# Patient Record
Sex: FEMALE | Race: WHITE | NOT HISPANIC OR LATINO | Employment: OTHER | URBAN - METROPOLITAN AREA
[De-identification: names, ages, dates, MRNs, and addresses within clinical notes are randomized per-mention and may not be internally consistent; named-entity substitution may affect disease eponyms.]

---

## 2024-02-05 ENCOUNTER — LAB (OUTPATIENT)
Dept: LAB | Facility: LAB | Age: 89
End: 2024-02-05
Payer: COMMERCIAL

## 2024-02-05 DIAGNOSIS — E55.9 VITAMIN D DEFICIENCY, UNSPECIFIED: ICD-10-CM

## 2024-02-05 DIAGNOSIS — L50.9 URTICARIA, UNSPECIFIED: ICD-10-CM

## 2024-02-05 DIAGNOSIS — R53.83 OTHER FATIGUE: Primary | ICD-10-CM

## 2024-02-05 DIAGNOSIS — E78.2 MIXED HYPERLIPIDEMIA: ICD-10-CM

## 2024-02-05 DIAGNOSIS — E11.9 TYPE 2 DIABETES MELLITUS WITHOUT COMPLICATIONS (MULTI): ICD-10-CM

## 2024-02-05 LAB
25(OH)D3 SERPL-MCNC: 45 NG/ML (ref 30–100)
ERYTHROCYTE [DISTWIDTH] IN BLOOD BY AUTOMATED COUNT: 13.1 % (ref 11.5–14.5)
EST. AVERAGE GLUCOSE BLD GHB EST-MCNC: 100 MG/DL
HBA1C MFR BLD: 5.1 %
HCT VFR BLD AUTO: 42.9 % (ref 36–46)
HGB BLD-MCNC: 14.4 G/DL (ref 12–16)
MCH RBC QN AUTO: 32.4 PG (ref 26–34)
MCHC RBC AUTO-ENTMCNC: 33.6 G/DL (ref 32–36)
MCV RBC AUTO: 96 FL (ref 80–100)
NRBC BLD-RTO: 0 /100 WBCS (ref 0–0)
PLATELET # BLD AUTO: 207 X10*3/UL (ref 150–450)
RBC # BLD AUTO: 4.45 X10*6/UL (ref 4–5.2)
TSH SERPL-ACNC: 1.31 MIU/L (ref 0.44–3.98)
WBC # BLD AUTO: 2.8 X10*3/UL (ref 4.4–11.3)

## 2024-02-05 PROCEDURE — 36415 COLL VENOUS BLD VENIPUNCTURE: CPT

## 2024-02-05 PROCEDURE — 82306 VITAMIN D 25 HYDROXY: CPT

## 2024-02-05 PROCEDURE — 80053 COMPREHEN METABOLIC PANEL: CPT

## 2024-02-05 PROCEDURE — 83036 HEMOGLOBIN GLYCOSYLATED A1C: CPT

## 2024-02-05 PROCEDURE — 84443 ASSAY THYROID STIM HORMONE: CPT

## 2024-02-05 PROCEDURE — 80061 LIPID PANEL: CPT

## 2024-02-05 PROCEDURE — 85027 COMPLETE CBC AUTOMATED: CPT

## 2024-02-06 LAB
ALBUMIN SERPL BCP-MCNC: 3.4 G/DL (ref 3.4–5)
ALP SERPL-CCNC: 91 U/L (ref 33–136)
ALT SERPL W P-5'-P-CCNC: 18 U/L (ref 7–45)
ANION GAP SERPL CALC-SCNC: 10 MMOL/L (ref 10–20)
AST SERPL W P-5'-P-CCNC: 21 U/L (ref 9–39)
BILIRUB SERPL-MCNC: 0.4 MG/DL (ref 0–1.2)
BUN SERPL-MCNC: 22 MG/DL (ref 6–23)
CALCIUM SERPL-MCNC: 9.4 MG/DL (ref 8.6–10.6)
CHLORIDE SERPL-SCNC: 111 MMOL/L (ref 98–107)
CHOLEST SERPL-MCNC: 187 MG/DL (ref 0–199)
CHOLESTEROL/HDL RATIO: 2.6
CO2 SERPL-SCNC: 30 MMOL/L (ref 21–32)
CREAT SERPL-MCNC: 0.95 MG/DL (ref 0.5–1.05)
EGFRCR SERPLBLD CKD-EPI 2021: 57 ML/MIN/1.73M*2
GLUCOSE SERPL-MCNC: 73 MG/DL (ref 74–99)
HDLC SERPL-MCNC: 72.6 MG/DL
LDLC SERPL CALC-MCNC: 104 MG/DL
NON HDL CHOLESTEROL: 114 MG/DL (ref 0–149)
POTASSIUM SERPL-SCNC: 4.7 MMOL/L (ref 3.5–5.3)
PROT SERPL-MCNC: 6.8 G/DL (ref 6.4–8.2)
SODIUM SERPL-SCNC: 146 MMOL/L (ref 136–145)
TRIGL SERPL-MCNC: 52 MG/DL (ref 0–149)
VLDL: 10 MG/DL (ref 0–40)

## 2024-03-14 ENCOUNTER — CONSULT (OUTPATIENT)
Dept: ALLERGY | Facility: CLINIC | Age: 89
End: 2024-03-14
Payer: COMMERCIAL

## 2024-03-14 VITALS
SYSTOLIC BLOOD PRESSURE: 139 MMHG | WEIGHT: 162 LBS | HEIGHT: 64 IN | DIASTOLIC BLOOD PRESSURE: 67 MMHG | TEMPERATURE: 97.5 F | BODY MASS INDEX: 27.66 KG/M2 | OXYGEN SATURATION: 96 % | HEART RATE: 60 BPM

## 2024-03-14 DIAGNOSIS — R21 RASH AND NONSPECIFIC SKIN ERUPTION: Primary | ICD-10-CM

## 2024-03-14 DIAGNOSIS — L29.9 PRURITUS: ICD-10-CM

## 2024-03-14 PROCEDURE — 99204 OFFICE O/P NEW MOD 45 MIN: CPT | Performed by: ALLERGY & IMMUNOLOGY

## 2024-03-14 RX ORDER — CETIRIZINE HYDROCHLORIDE 10 MG/1
10 TABLET ORAL DAILY PRN
Qty: 30 TABLET | Refills: 11 | Status: SHIPPED | OUTPATIENT
Start: 2024-03-14 | End: 2025-03-14

## 2024-03-14 ASSESSMENT — ENCOUNTER SYMPTOMS
RESPIRATORY NEGATIVE: 1
GASTROINTESTINAL NEGATIVE: 1
EYES NEGATIVE: 1
CONSTITUTIONAL NEGATIVE: 1
MUSCULOSKELETAL NEGATIVE: 1
ALLERGIC/IMMUNOLOGIC NEGATIVE: 1
HEMATOLOGIC/LYMPHATIC NEGATIVE: 1
CARDIOVASCULAR NEGATIVE: 1

## 2024-03-14 ASSESSMENT — PAIN SCALES - GENERAL: PAINLEVEL: 0-NO PAIN

## 2024-03-14 NOTE — PATIENT INSTRUCTIONS
It was nice to meet you today    We have referred you to  Dermatology for your rash     You may use Cetirizine (Zyrtec) 10 mg once daily as needed     Recommend using Dove Sensitive Skin soap and Cerave healing ointment

## 2024-03-14 NOTE — PROGRESS NOTES
"Radha Pinzon presents for initial evaluation today.      She presents with her daughter for today's visit.  History is obtained by Ms. Pinzon and her daughter.  They note that she has had an itchy rash on her lower legs since around age 45.  She is followed with wound management for stasis ulcers in the past.  They note that in August 2023, she developed an itchy rash which was different on her thighs, arms, underneath the breasts.  She is following with a nurse practitioner at [facility] and was started on hydroxyzine, topical triamcinolone, and a water pill.  She has also used cetirizine.  She notes that the topical agents have not helped, and her rash is very pruritic.  She has never had a skin biopsy.  The rash did not resolve with triamcinolone.     She denies being on any medications or supplements prior to the onset of the rash.     She denies history of allergic rhinitis, asthma, eczema, food allergy, drug allergy, venom allergy.      Review of Systems   Constitutional: Negative.    HENT: Negative.     Eyes: Negative.    Respiratory: Negative.     Cardiovascular: Negative.    Gastrointestinal: Negative.    Musculoskeletal: Negative.    Skin:  Positive for rash.   Allergic/Immunologic: Negative.    Hematological: Negative.        Vital signs:  /67   Pulse 60   Temp 36.4 °C (97.5 °F)   Ht 1.626 m (5' 4\")   Wt 73.5 kg (162 lb)   SpO2 96%   BMI 27.81 kg/m²     Physical Exam:  GENERAL: Alert, oriented and in no acute distress.     HEENT: EYES: No conjunctival injection or cobblestoning. Nose: nasal turbinates are not edematous and are not boggy.  There is no mucous stranding, polyps, or blood    noted. EARS: Tympanic membranes are clear. MOUTH: moist and pink with no exudates, ulcers, or thrush. NECK: is supple, without adenopathy.  No upper airway stridor noted.       HEART: regular rate and rhythm.       LUNGS: Clear to auscultation bilaterally. No wheezing, rhonchi or rales.        " ABDOMEN: Positive bowel sounds, soft, nontender, nondistended.       EXTREMITIES: No clubbing or edema.        SKIN: Hyperpigmented scaly rash on lower legs.  Hyperpigmented patches on left upper thigh.  Bilateral forearms with raised, rough appearance.  No urticaria or angioedema noted.    Arm:    Thigh:     Impression:  1. Rash and nonspecific skin eruption    2. Pruritus      Assessment and Plan:    We discussed that rash is unlikely due to food, drug, or environmental allergy.  She is not currently on any medications which may have triggered rash.  Will refer to dermatology for further evaluation.  May use cetirizine 10 mg once daily as needed for pruritus.    We would be happy to see her in follow-up as needed

## 2024-03-25 ENCOUNTER — OFFICE VISIT (OUTPATIENT)
Dept: DERMATOLOGY | Facility: CLINIC | Age: 89
End: 2024-03-25
Payer: COMMERCIAL

## 2024-03-25 DIAGNOSIS — I87.2 VENOUS INSUFFICIENCY: ICD-10-CM

## 2024-03-25 DIAGNOSIS — L30.9 DERMATITIS: Primary | ICD-10-CM

## 2024-03-25 PROCEDURE — 99204 OFFICE O/P NEW MOD 45 MIN: CPT | Performed by: STUDENT IN AN ORGANIZED HEALTH CARE EDUCATION/TRAINING PROGRAM

## 2024-03-25 PROCEDURE — 1159F MED LIST DOCD IN RCRD: CPT | Performed by: STUDENT IN AN ORGANIZED HEALTH CARE EDUCATION/TRAINING PROGRAM

## 2024-03-25 RX ORDER — FLUOCINONIDE 1 MG/G
1 CREAM TOPICAL 2 TIMES DAILY
Qty: 60 G | Refills: 11 | Status: SHIPPED | OUTPATIENT
Start: 2024-03-25 | End: 2024-04-08

## 2024-03-25 RX ORDER — TRIAMCINOLONE ACETONIDE 1 MG/G
OINTMENT TOPICAL 2 TIMES DAILY
Qty: 80 G | Refills: 3 | Status: SHIPPED | OUTPATIENT
Start: 2024-03-25 | End: 2024-04-08

## 2024-03-25 NOTE — PROGRESS NOTES
Trena Pinzon is a 89 y.o. female who presents for the following: Rash (Rash on both thighs , abdomen, and arms. Patient has had this for several months. Has been to allergist and they suggested seeing a Dermatologist for a biopsy. Has tried Triamcinolone however this did not improve the symptoms.).     Review of Systems:  No other skin or systemic complaints other than what is documented elsewhere in the note.    The following portions of the chart were reviewed this encounter and updated as appropriate:          Skin Cancer History  No skin cancer on file.      Specialty Problems    None       Objective   Well appearing patient in no apparent distress; mood and affect are within normal limits.    A focused skin examination was performed. All findings within normal limits unless otherwise noted below.    Assessment/Plan   1. Dermatitis  Left Forearm - Anterior, Left Thigh - Anterior, Right Forearm - Anterior, Right Thigh - Anterior  Chronic eczematous changes of anterior thighs and forearms  Xerotic skin    Asteatotic eczema vs autoeczematization from severe stasis  Discussed gentle skin care with bland emollient several times daily  Start Lidex 0.1% cream. Patient to apply to affected areas 2x daily x 2 weeks then 1 week off, repeat as needed. Side effects of topical steroids were reviewed including risk of skin atrophy.        Related Procedures  Follow Up In Dermatology - Established Patient    Related Medications  fluocinonide (LIDEX) 0.1 % cream  Apply 1 Application topically 2 times a day for 14 days. Then 7 days off. Repeat as needed    2. Venous insufficiency (2)  Left Leg, Right Leg  Severely swollen legs with taut skin and overlying erythematous plaques, crusting at ankles    Severe stasis dermatitis  Hx ulceration in past  Nearing lipodermatosclerosis in some places  Need to use compression daily while upright  Sleep with legs elevated  Every day apply triamcinolone 0.1% ointment to legs  prior to applying compression stockings  FU 3 months     Related Medications  triamcinolone (Kenalog) 0.1 % ointment  Apply topically 2 times a day for 14 days. Then 7 days off. Repeat as needed for rash.

## 2024-04-11 ENCOUNTER — OFFICE VISIT (OUTPATIENT)
Dept: ORTHOPEDIC SURGERY | Facility: CLINIC | Age: 89
End: 2024-04-11
Payer: COMMERCIAL

## 2024-04-11 ENCOUNTER — HOSPITAL ENCOUNTER (OUTPATIENT)
Dept: RADIOLOGY | Facility: CLINIC | Age: 89
Discharge: HOME | End: 2024-04-11
Payer: COMMERCIAL

## 2024-04-11 VITALS — HEIGHT: 64 IN | WEIGHT: 162 LBS | BODY MASS INDEX: 27.66 KG/M2

## 2024-04-11 DIAGNOSIS — M54.2 CERVICALGIA: ICD-10-CM

## 2024-04-11 DIAGNOSIS — M89.8X1 CLAVICLE PAIN: ICD-10-CM

## 2024-04-11 DIAGNOSIS — M54.2 CERVICALGIA: Primary | ICD-10-CM

## 2024-04-11 PROCEDURE — 72050 X-RAY EXAM NECK SPINE 4/5VWS: CPT

## 2024-04-11 PROCEDURE — 1159F MED LIST DOCD IN RCRD: CPT

## 2024-04-11 PROCEDURE — 99203 OFFICE O/P NEW LOW 30 MIN: CPT

## 2024-04-11 PROCEDURE — 72050 X-RAY EXAM NECK SPINE 4/5VWS: CPT | Performed by: RADIOLOGY

## 2024-04-11 ASSESSMENT — PAIN - FUNCTIONAL ASSESSMENT: PAIN_FUNCTIONAL_ASSESSMENT: 0-10

## 2024-04-11 NOTE — PROGRESS NOTES
HPI:  Radha simmons 89-year-old woman who presents today with a few week random onset history of bilateral clavicle pain that sometimes extends into the neck.  She states it is worse with turning her head.  She denies issues swallowing.  She denies pain at rest.  She denies radiating pain into the shoulders, arms, hands.  She does not have a history of this in the past.  She states she takes over-the-counter pain medicine and it helps.  [facility]  No other questions or concerns at time of visit.    ROS:  Reviewed on EMR and patient intake sheet.    PMH/SH:  Reviewed on EMR and patient intake sheet.    Exam:  MSK: Full strength range of motion of upper extremities bilaterally.  Negative Kayla, negative Spurling's.  Mild tenderness palpation to first few intercostal spaces, specifically on the right.  General: No acute distress. Awake and conversant.  Eyes: Normal conjunctiva, anicteric. Round symmetric pupils.  ENT: Hearing grossly intact. No nasal discharge.  Neck: Neck is supple. No masses or thyromegaly.  Respiratory: Respirations are non-labored. No wheezing.  Skin: Warm. No rashes or ulcers.  Psych: Alert and oriented. Cooperative, appropriate mood and affect, normal judgement.  CV: No lower extremity edema.  Neuro: Sensation and CN II-XII grossly normal.    Radiology:     X-rays personally reviewed and demonstrate moderate to severe cervical spondylosis throughout cervical spine.  No acute fractures or dislocations.    Diagnosis:    Clavicle pain  Intercostal tenderness    Assessment and Plan:  Patient was seen today and evaluated for clavicular pain and intercostal tenderness that started randomly a few weeks ago.  At this point, I recommended conservative management of over-the-counter pain medication such as Tylenol and ibuprofen.  Additionally, she may use over-the-counter creams such as IcyHot.  I do not believe the symptoms are presenting as typical  radicular symptoms, but it is possible.  She should follow-up with the HEENT appointment and I recommended she bring the symptoms to their attention.  Patient feels all questions were properly answered at time of visit today.  Patient agrees to the plan above.    This note was dictated using speech recognition software and was not corrected for spelling or grammatical errors    Tai Prakash PA-C  Department of Orthopaedic Surgery    [address] [place] [address]    Voicemail: [phone]   Appts: [phone]  Fax: [phone]

## 2024-04-24 ENCOUNTER — OFFICE VISIT (OUTPATIENT)
Dept: OTOLARYNGOLOGY | Facility: CLINIC | Age: 89
End: 2024-04-24
Payer: COMMERCIAL

## 2024-04-24 ENCOUNTER — CLINICAL SUPPORT (OUTPATIENT)
Dept: AUDIOLOGY | Facility: CLINIC | Age: 89
End: 2024-04-24
Payer: COMMERCIAL

## 2024-04-24 VITALS — BODY MASS INDEX: 25.27 KG/M2 | HEIGHT: 67 IN | WEIGHT: 161 LBS

## 2024-04-24 DIAGNOSIS — H90.3 BILATERAL SENSORINEURAL HEARING LOSS: ICD-10-CM

## 2024-04-24 DIAGNOSIS — H90.3 SENSORINEURAL HEARING LOSS (SNHL) OF BOTH EARS: ICD-10-CM

## 2024-04-24 DIAGNOSIS — H93.13 TINNITUS, BILATERAL: Primary | ICD-10-CM

## 2024-04-24 DIAGNOSIS — M54.2 NECK PAIN: ICD-10-CM

## 2024-04-24 DIAGNOSIS — H93.13 TINNITUS OF BOTH EARS: Primary | ICD-10-CM

## 2024-04-24 DIAGNOSIS — M43.6 NECK STIFFNESS: ICD-10-CM

## 2024-04-24 PROCEDURE — 1160F RVW MEDS BY RX/DR IN RCRD: CPT | Performed by: NURSE PRACTITIONER

## 2024-04-24 PROCEDURE — 1159F MED LIST DOCD IN RCRD: CPT | Performed by: NURSE PRACTITIONER

## 2024-04-24 PROCEDURE — 92567 TYMPANOMETRY: CPT

## 2024-04-24 PROCEDURE — 92557 COMPREHENSIVE HEARING TEST: CPT

## 2024-04-24 PROCEDURE — 99203 OFFICE O/P NEW LOW 30 MIN: CPT | Performed by: NURSE PRACTITIONER

## 2024-04-24 ASSESSMENT — PATIENT HEALTH QUESTIONNAIRE - PHQ9
SUM OF ALL RESPONSES TO PHQ9 QUESTIONS 1 AND 2: 1
1. LITTLE INTEREST OR PLEASURE IN DOING THINGS: NOT AT ALL
10. IF YOU CHECKED OFF ANY PROBLEMS, HOW DIFFICULT HAVE THESE PROBLEMS MADE IT FOR YOU TO DO YOUR WORK, TAKE CARE OF THINGS AT HOME, OR GET ALONG WITH OTHER PEOPLE: NOT DIFFICULT AT ALL
2. FEELING DOWN, DEPRESSED OR HOPELESS: SEVERAL DAYS

## 2024-04-24 ASSESSMENT — PAIN SCALES - GENERAL: PAINLEVEL_OUTOF10: 0 - NO PAIN

## 2024-04-24 ASSESSMENT — PAIN - FUNCTIONAL ASSESSMENT: PAIN_FUNCTIONAL_ASSESSMENT: 0-10

## 2024-04-24 NOTE — PROGRESS NOTES
AUDIOLOGIC EVALUATION      Name:  Radha Pinzon  :  1933  Age:  89 y.o.  Date of Evaluation:  2024    Time: 7914-4946    HISTORY  Radha Pinzon, 89 y.o., was seen for an audiologic assessment prior to ENT evaluation. She was accompanied to the appointment by her daughter. She reported the onset of bilateral musical tinnitus 3 weeks ago. She noted the tinnitus sound like gospel music and primarily happens at night. Her daughter noted that around the onset of tinnitus her speech became slightly slurred. She also has a history of arthritis in her neck and spine. She denied otalgia, otorrhea, dizziness, aural pressure/fullness, history of noise exposure, history of otologic surgeries, and recent falls.       RESULTS:  Otoscopic Evaluation:  Right Ear: Non-occluding cerumen  Left Ear: Non-occluding cerumen    Immittance Measures:  Right Ear: Type A -- indicating normal volume, pressure, and static compliance  Left Ear: Type A -- indicating normal volume, pressure, and static compliance    Acoustic Reflexes:  Right Ear: Thresholds present at expected levels for 500-1000 Hz, absent 3011-7267 Hz.  Left Ear: Thresholds present at expected levels for 500, 1000, and 4000 Hz, absent 2000 Hz.    Pure Tone Audiometry:    Right Ear: Mild to moderate sensorineural hearing loss 125-8000 Hz  Left Ear: Essentially mild to moderate sensorineural hearing loss 125-8000 Hz    Asymmetry: No    Reliability:   Good    Speech Audiometry:   Right: Speech Reception Threshold (SRT) was obtained at 40 dBHL.   SRT/PTA in Good agreement with pure tone average.    Left: Speech Reception Threshold (SRT) was obtained at 35 dBHL.   SRT/PTA in Good agreement with pure tone average.    Word Recognition Scores (WRS):  Right Ear: excellent (100%) in quiet when words were presented at 80 dBHL.   Left Ear: excellent (96%) in quiet when words were presented at 80 dBHL.     Asymmetry: No      IMPRESSIONS:  Today's testing revealed normal ear canal  volume, middle ear pressure, and tympanic membrane compliance in both ears. She has a mild to moderate sensorineural hearing loss in both ears with excellent word recognition scores. The results were discussed with the patient and her daughter. I noted that hearing loss can increase your risk of tinnitus. I encouraged them to follow-up with ENT for further evaluation of her musical tinnitus and speech difficulty.      RECOMMENDATIONS:  1.) Continue medical follow up with Ears Nose and Throat (ENT) provider as recommended.  2.) Return for audiologic evaluation in conjunction with medical management or annually (whichever is sooner) to monitor hearing sensitivity and assess middle ear status or sooner should concerns arise.  3.) Contact insurance company to inquire about where is in network for hearing aids. Can schedule a hearing aid evaluation with [facility] audiology department ([phone]) if desired. Patient was provided with a copy of today's audiogram.      Gilbert Peterson, CCC-A  Clinical Audiologist          KEY  SNHL Sensorineural Hearing Loss   CHL Conductive Hearing Loss   MHL Mixed Hearing Loss   SSNHL Sudden Sensorineural Hearing Loss   WNL Within Normal Limits   PTA Pure Tone Average   TM Tympanic Membrane   ECV Ear Canal Volume   SRT Speech Reception Threshold   WRS Word Recognition Score

## 2024-04-24 NOTE — PROGRESS NOTES
Subjective   Patient ID: Radha Pinzon is a 89 y.o. female who presents for New Patient Visit and Hearing Loss.  HPI  This patient is referred for evaluation of gradual hearing loss and musical tinnitus.  The patient is 89 and accompanied by her daughter.   When asked about ear pain, hearing loss, itching, discharge from ear, tinnitus, aural fullness or autophony, the patient admits to musical tinnitus .  She also complains of chronic neck pain and stiffness  The patient does not wear hearing aids.  When asked about a significant past otological history including history of prior ear surgery, noise exposure, exposure to ototoxic drugs or agents, and/or family history of hearing loss, the patient denies   Review of Systems  A comprehensive or 10 points review of the patient´s constitutional, neurological, HEENT, pulmonary, cardiovascular and genito-urinary systems showed only those mentioned in history of present illness    Objective   Physical Exam  Constitutional: no fever, chills, weight loss or weight gain   General appearance: Appears well, well-nourished, well groomed. No acute distress.   Communication: Normal communication   Psychiatric: Oriented to person, place and time. Normal mood and affect.   Neurologic: Cranial nerves II-XII grossly intact and symmetric bilaterally.   Head and Face:   Head: Atraumatic with no masses, lesions or scarring.   Face: Normal symmetry, no paralysis, synkinesis or facial tic. No scars or deformities.     Eyes: Conjunctiva not edematous or erythematous   Ears: External inspection of ears with no deformity, scars or masses. Bilateral nonocclusive dry cerumen. Bilateral Tm's intact.      Neck: Normal appearing, symmetric, trachea midline.   Cardiovascular: Examination of peripheral vascular system shows no clubbing or cyanosis.   Respiratory: No respiratory distress increased work of breathing. Inspection of the chest with symmetric chest expansion and normal respiratory effort.    Skin: No rashes in the head or neck     Audiogram showed mild/ moderate SNHL with word recognition scores and excellent tympanograms bilaterally.      Assessment/Plan     This patient presents for initial evaluation of chronic acquired bilateral subjective tinnitus, bilateral sensorineural hearing loss, neck pain, neck stiffness.      Start recommended oil based drops for dry cerumen. Pt. For neck stiffness and pain.    Reassurance given otologic exam is normal after cleaning. Patient may follow up as needed. All questions answered to patients satisfaction.      MORENO Paige-CNP 04/24/24 3:37 PM

## 2024-05-20 ENCOUNTER — APPOINTMENT (OUTPATIENT)
Dept: DERMATOLOGY | Facility: CLINIC | Age: 89
End: 2024-05-20
Payer: COMMERCIAL

## 2024-05-29 ENCOUNTER — EVALUATION (OUTPATIENT)
Dept: PHYSICAL THERAPY | Facility: CLINIC | Age: 89
End: 2024-05-29
Payer: COMMERCIAL

## 2024-05-29 DIAGNOSIS — M43.6 NECK STIFFNESS: ICD-10-CM

## 2024-05-29 DIAGNOSIS — M54.2 NECK PAIN: Primary | ICD-10-CM

## 2024-05-29 PROCEDURE — 97161 PT EVAL LOW COMPLEX 20 MIN: CPT | Mod: GP

## 2024-05-29 PROCEDURE — 97110 THERAPEUTIC EXERCISES: CPT | Mod: GP

## 2024-05-29 ASSESSMENT — ENCOUNTER SYMPTOMS
DEPRESSION: 1
OCCASIONAL FEELINGS OF UNSTEADINESS: 1
LOSS OF SENSATION IN FEET: 0

## 2024-05-29 NOTE — PROGRESS NOTES
Physical Therapy Evaluation    Patient Name: Radha Pinzon  MRN: [id number]  Today's Date: 5/29/2024  Time Calculation  Start Time: 1125  Stop Time: 1205  Time Calculation (min): 40 min  PT Evaluation Time Entry  PT Evaluation (Low) Time Entry: 25  PT Therapeutic Procedures Time Entry  Therapeutic Exercise Time Entry: 15        Insurance:   [facility] DUAL COMPLETE   NO AUTH NEEDED, NO COPAY, VISITS BASED ON MED NEC  Visit #1    Assessment   Radha Pinzon is a 89 y.o. referred for neck pain and stiffness that arose insidiously 3-4 months ago. Patient demonstrates cervical AROM, decreased postural awareness in sitting, and decreased force production upon examination. At this time, patient is limited with turning her head. Patient would benefit from PT interventions to address the stated impairments, improve functional mobility, establish HEP, and assist with management of chronic symptoms.    Plan  Treatment/Interventions: Education/ Instruction, Hot pack, Manual therapy, Therapeutic exercises, Therapeutic activities  PT Plan: Skilled PT  PT Frequency: 1 time per week  Duration: 4-6 visits  Certification Period Start Date: 05/29/24  Certification Period End Date: 08/27/24  Rehab Potential: Good  Plan of Care Agreement: Patient    Primary diagnosis: neck pain  Current Problem  1. Neck pain  Referral to Physical Therapy    Follow Up In Physical Therapy      2. Neck stiffness  Referral to Physical Therapy    Follow Up In Physical Therapy          General:  General  Reason for Referral: Diagnosis  M43.6 (ICD-10-CM) - Neck stiffness  M54.2 (ICD-10-CM) - Neck pain  Referred By: MORENO Barlow-CNP  Precautions:  Precautions  STEADI Fall Risk Score (The score of 4 or more indicates an increased risk of falling): 4  Recommend continued use of her assistive device to assist with mobility and decrease risk for falls    Daughter present for evaluation, assist with paperwork.    Subjective:  Chief complaints: neck pain,  severe arthritis of the neck, popping in the neck  Onset Date: 3-4 months  Mechanism of Injury: gradual onset, no injury or trauma  Previous History: no issues with neck pain in the past  Personal Factors that may impact care: transportation   Patient is cleared for physical therapy services by physician referral. Discussed concerns on intake form. Advised patient to follow up with referring provider &/or PCP to address. The patient does report feeling down. We discussed concern/need for further assessment/intervention with follow up and the  Mental Health Resource List was provided to her. PHQ-9 completed and in chart.    Pain:  Current: 5/10 Best: 5/10 Worst: 8/10   Location: neck, upper trap, clavicle L>R   Type: sharp, lasts a couple seconds, stiff; good days and bad days   Aggravators: turning head   Alleviators: being still   Numbness/tingling? no    Function:   Work/Recreation: leg exercises   Prior Level: no history of neck pain   Current limitations: discomfort/pain/popping with turning head   Condition: Unchanged     Home Situation:    Stairs: with handrails   Lives with daughter    Sleep: pain is not waking her up from sleep, one pillow (cervical)     Goals for Therapy:    Relief from stiffness and pain and popping    Objective   Gait: Mod Indep with single point cane, decreased douglas  Posture: decreased lumbar lordosis, forward flexed posture, forward head, rounded shoulders  Palpation: (+) TTP bilateral upper trap, SCM, scalenes, proximal clavicle bilaterally  Sensation: intact to light touch    Cervical AROM:  Flexion: 20 deg  Extension: 25 deg  Side Bend R: 10 deg  Side Bend L: 10 deg  Rotation R: 40 deg  Rotation L: 30 deg P!    Shoulder AROM:  Flexion: 90 deg  Abduction: deg    Cervical strength: 3+/5    Shoulder Strength:  Flexion: 4-/5 B  Abduction:  4-/5 B  Extension: 4-/5 B    Elbow & Wrist Strength:  Flexion: 4/5 B  Extension:  4-/5 B    Outcome Measures:  Neck Disability Index (NDI): 42%  disability    Treatment:  Therapeutic Exercise: Education modification or discontinuation of any exercise if adverse reaction arises. Supine cervical rotation, supine head press, seated upright posture, seated scapular retractions.      Access Code: [email or web address]  URL: [email or web address]  Date: 05/29/2024  Prepared by: Philly    HEP Exercises  - Head Press  - 2 sets - 10 reps - 5 seconds hold  - Supine Cervical Rotation AROM on Pillow  - 2 sets - 10 reps  - Upright Posture  - 10 reps  - Seated Scapular Retraction  - 2 sets - 10 reps    Goals:  Active       PT Problem       Patient will improve cervical rotation by >=5 deg to increase ease with turning head       Start:  05/29/24    Expected End:  07/24/24            Patient will turn head with pain <3/10 on average, no greater than 6/10 at worst       Start:  05/29/24    Expected End:  07/24/24            Patient will demonstrate independence and compliance with HEP and self management       Start:  05/29/24    Expected End:  07/24/24            Patient will demonstrate a 10% improvement on NDI to demonstrate increased functional mobility        Start:  05/29/24    Expected End:  07/24/24

## 2024-06-05 ENCOUNTER — APPOINTMENT (OUTPATIENT)
Dept: PHYSICAL THERAPY | Facility: CLINIC | Age: 89
End: 2024-06-05
Payer: COMMERCIAL

## 2024-06-12 ENCOUNTER — APPOINTMENT (OUTPATIENT)
Dept: PHYSICAL THERAPY | Facility: CLINIC | Age: 89
End: 2024-06-12
Payer: COMMERCIAL

## 2024-06-19 ENCOUNTER — APPOINTMENT (OUTPATIENT)
Dept: PHYSICAL THERAPY | Facility: CLINIC | Age: 89
End: 2024-06-19
Payer: COMMERCIAL

## 2024-07-03 ENCOUNTER — APPOINTMENT (OUTPATIENT)
Dept: PHYSICAL THERAPY | Facility: CLINIC | Age: 89
End: 2024-07-03
Payer: COMMERCIAL

## 2024-07-03 ENCOUNTER — DOCUMENTATION (OUTPATIENT)
Dept: PHYSICAL THERAPY | Facility: CLINIC | Age: 89
End: 2024-07-03
Payer: COMMERCIAL

## 2024-07-03 NOTE — PROGRESS NOTES
Physical Therapy                 Therapy Communication Note    Patient Name: Radha Pinzon  MRN: [id number]  Today's Date: 7/3/2024     Discipline: Physical Therapy    Missed Visit Reason:  Cancel    Missed Time: Cancel 07/03/2024    Comment: Patient cancelled last scheduled appointment and requests transfer to [facility] to correlate appointments with family member. POC to be transferred to primary therapist at [facility].

## 2024-07-08 ENCOUNTER — APPOINTMENT (OUTPATIENT)
Dept: PHYSICAL THERAPY | Facility: CLINIC | Age: 89
End: 2024-07-08
Payer: COMMERCIAL

## 2024-07-11 ENCOUNTER — APPOINTMENT (OUTPATIENT)
Dept: PHYSICAL THERAPY | Facility: CLINIC | Age: 89
End: 2024-07-11
Payer: COMMERCIAL

## 2024-07-12 ENCOUNTER — APPOINTMENT (OUTPATIENT)
Dept: PHYSICAL THERAPY | Facility: CLINIC | Age: 89
End: 2024-07-12
Payer: COMMERCIAL

## 2024-07-18 ENCOUNTER — APPOINTMENT (OUTPATIENT)
Dept: PHYSICAL THERAPY | Facility: CLINIC | Age: 89
End: 2024-07-18
Payer: COMMERCIAL

## 2024-07-23 ENCOUNTER — TREATMENT (OUTPATIENT)
Dept: PHYSICAL THERAPY | Facility: CLINIC | Age: 89
End: 2024-07-23
Payer: COMMERCIAL

## 2024-07-23 DIAGNOSIS — M43.6 NECK STIFFNESS: ICD-10-CM

## 2024-07-23 DIAGNOSIS — M54.2 NECK PAIN: ICD-10-CM

## 2024-07-23 PROCEDURE — 97140 MANUAL THERAPY 1/> REGIONS: CPT | Mod: GP

## 2024-07-23 NOTE — PROGRESS NOTES
Physical Therapy    Physical Therapy Treatment    Patient Name: Radha Pinzon  MRN: [id number]  Today's Date: 7/23/2024    Time Entry:   Time Calculation  Start Time: 1350  Stop Time: 1430  Time Calculation (min): 40 min     PT Therapeutic Procedures Time Entry  Manual Therapy Time Entry: 25  Visit: 2    Assessment:   Patient with hypertonic SCMs bilaterally which was improved post manual therapy and a hot pack.   Plan:   Continue with plan of care once a week.     Current Problem  Problem List Items Addressed This Visit    None  Visit Diagnoses         Codes    Neck stiffness     M43.6    Neck pain     M54.2            Subjective:   General  Reason for Referral: Diagnosis M43.6 (ICD-10-CM) - Neck stiffness M54.2 (ICD-10-CM) - Neck pain  Referred By: Katrin Bellamy, MORENO-CNP  General Comment: Patient reports pain along her SCM's. States the left side is more painful than the right. Today she rates her pain level at 5-6/10.      Objective     Treatments:  Manual Therapy  Manual Therapy Performed: Yes  Manual Therapy Activity 1: STM and TPR to SCMs, upper trapezius, levator scapula, suboccipitals  Manual Therapy Activity 2: cervical traction    Hot pack post session x 10'    Goals:  Active       PT Problem       Patient will improve cervical rotation by >=5 deg to increase ease with turning head       Start:  05/29/24    Expected End:  07/24/24            Patient will turn head with pain <3/10 on average, no greater than 6/10 at worst       Start:  05/29/24    Expected End:  07/24/24            Patient will demonstrate independence and compliance with HEP and self management       Start:  05/29/24    Expected End:  07/24/24            Patient will demonstrate a 10% improvement on NDI to demonstrate increased functional mobility        Start:  05/29/24    Expected End:  07/24/24

## 2024-07-24 ENCOUNTER — APPOINTMENT (OUTPATIENT)
Dept: PHYSICAL THERAPY | Facility: CLINIC | Age: 89
End: 2024-07-24
Payer: COMMERCIAL

## 2024-08-08 ENCOUNTER — TREATMENT (OUTPATIENT)
Dept: PHYSICAL THERAPY | Facility: CLINIC | Age: 89
End: 2024-08-08
Payer: COMMERCIAL

## 2024-08-08 DIAGNOSIS — M43.6 NECK STIFFNESS: ICD-10-CM

## 2024-08-08 DIAGNOSIS — M54.2 NECK PAIN: ICD-10-CM

## 2024-08-08 PROCEDURE — 97140 MANUAL THERAPY 1/> REGIONS: CPT | Mod: GP

## 2024-08-08 PROCEDURE — 97110 THERAPEUTIC EXERCISES: CPT | Mod: GP

## 2024-08-08 NOTE — PROGRESS NOTES
"Physical Therapy    Physical Therapy Treatment    Patient Name: Radha Pinzon  MRN: [id number]  Today's Date: 8/8/2024    Time Entry:   Time Calculation  Start Time: 1500  Stop Time: 1540  Time Calculation (min): 40 min     PT Therapeutic Procedures Time Entry  Manual Therapy Time Entry: 25  Therapeutic Exercise Time Entry: 15  Visit: 3    Assessment:   The patient tolerated the manual therapy and therapeutic exercise well this session.   Plan:   Continue with plan of care     Current Problem  Problem List Items Addressed This Visit    None  Visit Diagnoses         Codes    Neck stiffness     M43.6    Neck pain     M54.2            Subjective:   General  Reason for Referral: Diagnosis M43.6 (ICD-10-CM) - Neck stiffness M54.2 (ICD-10-CM) - Neck pain  Referred By: Katrin Bellamy, APRN-CNP  General Comment: Patient reports no pain in her neck currently.      Objective     Treatments:  Therapeutic Exercise  Therapeutic Exercise Performed: Yes  Therapeutic Exercise Activity 1: chin tucks 10 x 3\"  Therapeutic Exercise Activity 2: yellow TB bilateral horizontal abduction 3 x 10  Therapeutic Exercise Activity 3: shoulder flexion x 10  Therapeutic Exercise Activity 4: ER and IR x 10  Therapeutic Exercise Activity 5: cervical rotation x 10    Manual Therapy  Manual Therapy Performed: Yes  Manual Therapy Activity 1: STM and TPR to SCMs, upper trapezius, levator scapula, suboccipitals  Manual Therapy Activity 2: cervical traction    Goals:  Active       PT Problem       Patient will improve cervical rotation by >=5 deg to increase ease with turning head       Start:  05/29/24    Expected End:  07/24/24            Patient will turn head with pain <3/10 on average, no greater than 6/10 at worst       Start:  05/29/24    Expected End:  07/24/24            Patient will demonstrate independence and compliance with HEP and self management       Start:  05/29/24    Expected End:  07/24/24            Patient will demonstrate a 10% " improvement on NDI to demonstrate increased functional mobility        Start:  05/29/24    Expected End:  07/24/24

## 2024-08-15 ENCOUNTER — TREATMENT (OUTPATIENT)
Dept: PHYSICAL THERAPY | Facility: CLINIC | Age: 89
End: 2024-08-15
Payer: COMMERCIAL

## 2024-08-15 ENCOUNTER — OFFICE VISIT (OUTPATIENT)
Dept: WOUND CARE | Facility: CLINIC | Age: 89
End: 2024-08-15
Payer: COMMERCIAL

## 2024-08-15 DIAGNOSIS — M54.2 NECK PAIN: ICD-10-CM

## 2024-08-15 DIAGNOSIS — M43.6 NECK STIFFNESS: ICD-10-CM

## 2024-08-15 PROCEDURE — 11042 DBRDMT SUBQ TIS 1ST 20SQCM/<: CPT

## 2024-08-15 NOTE — PROGRESS NOTES
"Physical Therapy    Physical Therapy Treatment    Patient Name: Radha Pinzon  MRN: [id number]  Today's Date: 8/15/2024    Time Entry:   Time Calculation  Start Time: 1400  Stop Time: 1440  Time Calculation (min): 40 min     PT Therapeutic Procedures Time Entry  Therapeutic Exercise Time Entry: 40  Visit: 3    Assessment:  D/w patient and her daughter sitting w/ better posture using a lumbar roll and elevate her legs while lying down; maybe on a couch.     Plan:   Continue with plan of care     Current Problem  Problem List Items Addressed This Visit    None  Visit Diagnoses         Codes    Neck stiffness     M43.6    Neck pain     M54.2            Subjective:   Reports she sits w/ her feet up w/ poor posture to control swelling in her B LE. Compliant w/ HEP.       Objective   Unable to flex at trunk seated > 5 deg , unable to sit w/ B knee flexion < ~  deg     Treatments:  Therapeutic Exercise  Therapeutic Exercise Performed: Yes  Therapeutic Exercise Activity 1: Seated chin tucks 2 x 10 x 3\"  Therapeutic Exercise Activity 2: yellow TB bilateral horizontal abduction 3 x 10  Therapeutic Exercise Activity 3: shoulder flexion x 10  Therapeutic Exercise Activity 4: YTB ER and IR x 10  Therapeutic Exercise Activity 5: cervical rotation 2 x 10  Therapeutic Exercise Activity 6: seated posture correction w/ use of lumbar roll  Therapeutic Exercise Activity 7: Cervical rotation w/ lumbar roll x 10 R/L  Therapeutic Exercise Activity 8: attempted table slides    Manual Therapy  Manual Therapy Performed: No    Goals:  Active       PT Problem       Patient will improve cervical rotation by >=5 deg to increase ease with turning head       Start:  05/29/24    Expected End:  07/24/24            Patient will turn head with pain <3/10 on average, no greater than 6/10 at worst       Start:  05/29/24    Expected End:  07/24/24            Patient will demonstrate independence and compliance with HEP and self management       Start:  " 05/29/24    Expected End:  07/24/24            Patient will demonstrate a 10% improvement on NDI to demonstrate increased functional mobility        Start:  05/29/24    Expected End:  07/24/24

## 2024-08-22 ENCOUNTER — APPOINTMENT (OUTPATIENT)
Dept: WOUND CARE | Facility: CLINIC | Age: 89
End: 2024-08-22
Payer: COMMERCIAL

## 2024-08-29 ENCOUNTER — OFFICE VISIT (OUTPATIENT)
Dept: WOUND CARE | Facility: CLINIC | Age: 89
End: 2024-08-29
Payer: COMMERCIAL

## 2024-08-29 PROCEDURE — 11042 DBRDMT SUBQ TIS 1ST 20SQCM/<: CPT

## 2024-08-30 ENCOUNTER — ANCILLARY PROCEDURE (OUTPATIENT)
Dept: VASCULAR MEDICINE | Facility: CLINIC | Age: 89
End: 2024-08-30
Payer: COMMERCIAL

## 2024-08-30 DIAGNOSIS — M99.13 SUBLUXATION COMPLEX (VERTEBRAL) OF LUMBAR REGION: ICD-10-CM

## 2024-08-30 DIAGNOSIS — I73.9 PERIPHERAL VASCULAR DISEASE, UNSPECIFIED (CMS-HCC): ICD-10-CM

## 2024-08-30 PROCEDURE — 93970 EXTREMITY STUDY: CPT

## 2024-08-30 PROCEDURE — 93970 EXTREMITY STUDY: CPT | Performed by: INTERNAL MEDICINE

## 2024-09-03 ENCOUNTER — TREATMENT (OUTPATIENT)
Dept: PHYSICAL THERAPY | Facility: CLINIC | Age: 89
End: 2024-09-03
Payer: COMMERCIAL

## 2024-09-03 DIAGNOSIS — M54.2 NECK PAIN: ICD-10-CM

## 2024-09-03 DIAGNOSIS — M43.6 NECK STIFFNESS: ICD-10-CM

## 2024-09-05 ENCOUNTER — OFFICE VISIT (OUTPATIENT)
Dept: WOUND CARE | Facility: CLINIC | Age: 89
End: 2024-09-05
Payer: COMMERCIAL

## 2024-09-05 PROCEDURE — 11042 DBRDMT SUBQ TIS 1ST 20SQCM/<: CPT

## 2024-09-10 ENCOUNTER — TREATMENT (OUTPATIENT)
Dept: PHYSICAL THERAPY | Facility: CLINIC | Age: 89
End: 2024-09-10
Payer: COMMERCIAL

## 2024-09-10 DIAGNOSIS — M54.2 NECK PAIN: ICD-10-CM

## 2024-09-10 DIAGNOSIS — M43.6 NECK STIFFNESS: Primary | ICD-10-CM

## 2024-09-10 NOTE — PROGRESS NOTES
"Physical Therapy    Physical Therapy Treatment    Patient Name: Radha Pinzon  MRN: [id number]  Today's Date: 9/10/2024    Time Entry:   Time Calculation  Start Time: 1550  Stop Time: 1630  Time Calculation (min): 40 min     PT Therapeutic Procedures Time Entry  Therapeutic Exercise Time Entry: 40  Visit: 4    Assessment:  Good neck flexion strength and and able perform supine DNF w/o c/o. Good scap recruitment w/ horizontal abduction in supine. Struggles w/ retraction falling into protraction + extension .  D/w Radha , evaluating PT , and daughter re: asking MD for further evaluation re: limited trunk and/or hip flexion which severely limits sit/stand tranfers and maximally impacts her independence / quality of life.    Plan:   Continue with plan of care     Current Problem  Problem List Items Addressed This Visit    None  Visit Diagnoses         Codes    Neck stiffness    -  Primary M43.6    Neck pain     M54.2              Subjective:   Doing well with neck exercises . Her legs predominate her concerns , \"they itch at night.\"       Objective   Trunk flexion limited to ~110 deg causing struggle w/ sit to stand transfers     Treatments:  Therapeutic Exercise  Therapeutic Exercise Performed: Yes  Therapeutic Exercise Activity 1: Seated chin tucks 2 x 10 x 3\"  Therapeutic Exercise Activity 2: yellow TB bilateral horizontal abduction 3 x 10  Therapeutic Exercise Activity 3: shoulder flexion x 10  Therapeutic Exercise Activity 4: YTB ER and IR x 10  Therapeutic Exercise Activity 5: cervical rotation 2 x 10  Therapeutic Exercise Activity 6: neck isometrics lateral and flexion 10 reps each  Therapeutic Exercise Activity 7: supine chin tucks x 10 ; extension vs retraction  Therapeutic Exercise Activity 8: UBE 2'/2' F/R min A on/off equip    Manual Therapy  Manual Therapy Performed: Yes  Manual Therapy Activity 1: manual neck rectraction w/ chin tuck  Manual Therapy Activity 2: PROM DNF    Goals:  Active       PT Problem  "      Patient will improve cervical rotation by >=5 deg to increase ease with turning head       Start:  05/29/24    Expected End:  07/24/24            Patient will turn head with pain <3/10 on average, no greater than 6/10 at worst       Start:  05/29/24    Expected End:  07/24/24            Patient will demonstrate independence and compliance with HEP and self management       Start:  05/29/24    Expected End:  07/24/24            Patient will demonstrate a 10% improvement on NDI to demonstrate increased functional mobility        Start:  05/29/24    Expected End:  07/24/24               Detail Level: Detailed Quality 130: Documentation Of Current Medications In The Medical Record: Current Medications Documented Quality 474: Zoster Vaccination Status: Shingrix Vaccination Administered or Previously Received Quality 337: Tuberculosis Prevention For Psoriasis And Psoriatic Arthritis Patients On A Biological Immune Response Modifier: Patient has a documented negative annual TB screening. Quality 143: Oncology: Medical And Radiation- Pain Intensity Quantified: Pain severity not assessed. Quality 410: Psoriasis Clinical Response To Oral Systemic Or Biologic Mediations: Documentation that the patient has documented contraindications (eg: experienced adverse effects with all other therapy options) in order to achieve better disease control as measured by PGA, BSA, PASI etc.. Quality 400a: One-Time Screening For Hepatitis C Virus (Hcv) For All Patients: Patient received one-time screening for HCV infection

## 2024-09-12 ENCOUNTER — OFFICE VISIT (OUTPATIENT)
Dept: WOUND CARE | Facility: CLINIC | Age: 89
End: 2024-09-12
Payer: COMMERCIAL

## 2024-09-12 PROCEDURE — 11042 DBRDMT SUBQ TIS 1ST 20SQCM/<: CPT

## 2024-09-17 ENCOUNTER — APPOINTMENT (OUTPATIENT)
Dept: PHYSICAL THERAPY | Facility: CLINIC | Age: 89
End: 2024-09-17
Payer: COMMERCIAL

## 2024-09-19 ENCOUNTER — TREATMENT (OUTPATIENT)
Dept: PHYSICAL THERAPY | Facility: CLINIC | Age: 89
End: 2024-09-19
Payer: COMMERCIAL

## 2024-09-19 ENCOUNTER — OFFICE VISIT (OUTPATIENT)
Dept: WOUND CARE | Facility: CLINIC | Age: 89
End: 2024-09-19
Payer: COMMERCIAL

## 2024-09-19 DIAGNOSIS — M54.2 NECK PAIN: ICD-10-CM

## 2024-09-19 DIAGNOSIS — M43.6 NECK STIFFNESS: Primary | ICD-10-CM

## 2024-09-19 PROCEDURE — 11042 DBRDMT SUBQ TIS 1ST 20SQCM/<: CPT

## 2024-09-19 PROCEDURE — 97110 THERAPEUTIC EXERCISES: CPT | Mod: GP,CQ | Performed by: PHYSICAL THERAPY ASSISTANT

## 2024-09-19 NOTE — PROGRESS NOTES
Physical Therapy    Physical Therapy Treatment    Patient Name: Radha Pinzon  MRN: [id number]  Today's Date: 9/19/2024    Time Entry:   Time Calculation  Start Time: 1540  Stop Time: 1625  Time Calculation (min): 45 min     PT Therapeutic Procedures Time Entry  Therapeutic Exercise Time Entry: 40  Visit: 5    Assessment:  Good AROM noted w/ exercises added today . She reported the diagonals felt good.   Plan:   Continue with plan of care     Current Problem  Problem List Items Addressed This Visit    None  Visit Diagnoses         Codes    Neck stiffness    -  Primary M43.6    Neck pain     M54.2                Subjective:   Awaiting test results from PCP . Arriving from 'wrapping my legs'.     Objective       Treatments:  Therapeutic Exercise  Therapeutic Exercise Performed: Yes  Therapeutic Exercise Activity 2: yellow TB bilateral horizontal abduction 3 x 10  Therapeutic Exercise Activity 3: shoulder flexion x 10  Therapeutic Exercise Activity 4: YTB ER and IR x 10  Therapeutic Exercise Activity 5: cervical rotation 2 x 10  Therapeutic Exercise Activity 6: neck isometrics lateral and flexion 10 reps each  Therapeutic Exercise Activity 7: supine chin tucks x 10 ; extension vs retraction  Therapeutic Exercise Activity 8: Diagonals YTB x 5 reps R/L  Therapeutic Exercise Activity 9: NuStep B UE only x 3 min    Manual Therapy  Manual Therapy Performed: Yes    Goals:  Active       PT Problem       Patient will improve cervical rotation by >=5 deg to increase ease with turning head       Start:  05/29/24    Expected End:  07/24/24            Patient will turn head with pain <3/10 on average, no greater than 6/10 at worst       Start:  05/29/24    Expected End:  07/24/24            Patient will demonstrate independence and compliance with HEP and self management       Start:  05/29/24    Expected End:  07/24/24            Patient will demonstrate a 10% improvement on NDI to demonstrate increased functional mobility         Start:  05/29/24    Expected End:  07/24/24

## 2024-09-26 ENCOUNTER — APPOINTMENT (OUTPATIENT)
Dept: WOUND CARE | Facility: CLINIC | Age: 89
End: 2024-09-26
Payer: COMMERCIAL

## 2024-09-27 ENCOUNTER — OFFICE VISIT (OUTPATIENT)
Dept: WOUND CARE | Facility: CLINIC | Age: 89
End: 2024-09-27
Payer: COMMERCIAL

## 2024-09-27 PROCEDURE — 11042 DBRDMT SUBQ TIS 1ST 20SQCM/<: CPT

## 2024-10-03 ENCOUNTER — APPOINTMENT (OUTPATIENT)
Dept: WOUND CARE | Facility: CLINIC | Age: 89
End: 2024-10-03
Payer: COMMERCIAL

## 2024-10-04 ENCOUNTER — OFFICE VISIT (OUTPATIENT)
Dept: WOUND CARE | Facility: CLINIC | Age: 89
End: 2024-10-04
Payer: COMMERCIAL

## 2024-10-04 PROCEDURE — 11042 DBRDMT SUBQ TIS 1ST 20SQCM/<: CPT

## 2024-10-08 ENCOUNTER — APPOINTMENT (OUTPATIENT)
Dept: PHYSICAL THERAPY | Facility: CLINIC | Age: 89
End: 2024-10-08
Payer: COMMERCIAL

## 2024-10-08 DIAGNOSIS — M43.6 NECK STIFFNESS: Primary | ICD-10-CM

## 2024-10-08 DIAGNOSIS — M54.2 NECK PAIN: ICD-10-CM

## 2024-10-11 ENCOUNTER — OFFICE VISIT (OUTPATIENT)
Dept: WOUND CARE | Facility: CLINIC | Age: 89
End: 2024-10-11
Payer: COMMERCIAL

## 2024-10-11 PROCEDURE — 11042 DBRDMT SUBQ TIS 1ST 20SQCM/<: CPT

## 2024-10-18 ENCOUNTER — OFFICE VISIT (OUTPATIENT)
Dept: WOUND CARE | Facility: CLINIC | Age: 89
End: 2024-10-18
Payer: COMMERCIAL

## 2024-10-18 ENCOUNTER — APPOINTMENT (OUTPATIENT)
Dept: PHYSICAL THERAPY | Facility: CLINIC | Age: 89
End: 2024-10-18
Payer: COMMERCIAL

## 2024-10-18 PROCEDURE — 11042 DBRDMT SUBQ TIS 1ST 20SQCM/<: CPT

## 2024-10-24 ENCOUNTER — TREATMENT (OUTPATIENT)
Dept: PHYSICAL THERAPY | Facility: CLINIC | Age: 89
End: 2024-10-24
Payer: COMMERCIAL

## 2024-10-24 DIAGNOSIS — M43.6 NECK STIFFNESS: ICD-10-CM

## 2024-10-24 DIAGNOSIS — M54.2 NECK PAIN: ICD-10-CM

## 2024-10-24 PROCEDURE — 97110 THERAPEUTIC EXERCISES: CPT | Mod: GP,CQ | Performed by: PHYSICAL THERAPY ASSISTANT

## 2024-10-24 NOTE — PROGRESS NOTES
"Physical Therapy    Physical Therapy Treatment    Patient Name: Radha Pinzon  MRN: [id number]  Today's Date: 10/24/2024    Time Entry:   Time Calculation  Start Time: 1710  Stop Time: 1755  Time Calculation (min): 45 min     PT Therapeutic Procedures Time Entry  Therapeutic Exercise Time Entry: 40  Visit: 6    Assessment:  Did well with YTB for diagonals w/o pain or compensatory motions.   Plan:  Measure cervical rotation ROM .     Current Problem  Problem List Items Addressed This Visit    None  Visit Diagnoses         Codes    Neck stiffness     M43.6    Neck pain     M54.2                Subjective:   Reports her neck and shoulders are better; most concerned re: her legs. \"They itch\".     Objective   Standing shoulder extension WNL R/L   Limited trunk flexion making erect sitting posture and  transfers awkward      Treatments:  Therapeutic Exercise  Therapeutic Exercise Performed: Yes  Therapeutic Exercise Activity 1: standing extension x 10 reps R/L  Therapeutic Exercise Activity 2: yellow TB bilateral horizontal abduction 3 x 10  Therapeutic Exercise Activity 3: shoulder flexion x 10  Therapeutic Exercise Activity 4: YTB ER and IR x 10  Therapeutic Exercise Activity 5: cervical rotation 2 x 10  Therapeutic Exercise Activity 6: neck isometrics lateral and flexion 10 reps each  Therapeutic Exercise Activity 7: supine chin tucks x 10 ; extension vs retraction  Therapeutic Exercise Activity 8: Diagonals YTB x 5 reps R/L  Therapeutic Exercise Activity 9: bicep curl YTB x 10    Manual Therapy  Manual Therapy Performed: No    Goals:  Active       PT Problem       Patient will improve cervical rotation by >=5 deg to increase ease with turning head       Start:  05/29/24    Expected End:  07/24/24            Patient will turn head with pain <3/10 on average, no greater than 6/10 at worst       Start:  05/29/24    Expected End:  07/24/24            Patient will demonstrate independence and compliance with HEP and self " management       Start:  05/29/24    Expected End:  07/24/24            Patient will demonstrate a 10% improvement on NDI to demonstrate increased functional mobility        Start:  05/29/24    Expected End:  07/24/24

## 2024-10-25 ENCOUNTER — OFFICE VISIT (OUTPATIENT)
Dept: WOUND CARE | Facility: CLINIC | Age: 89
End: 2024-10-25
Payer: COMMERCIAL

## 2024-10-25 PROCEDURE — 11042 DBRDMT SUBQ TIS 1ST 20SQCM/<: CPT

## 2024-11-01 ENCOUNTER — OFFICE VISIT (OUTPATIENT)
Dept: WOUND CARE | Facility: CLINIC | Age: 89
End: 2024-11-01
Payer: COMMERCIAL

## 2024-11-01 PROCEDURE — 11042 DBRDMT SUBQ TIS 1ST 20SQCM/<: CPT

## 2024-11-08 ENCOUNTER — OFFICE VISIT (OUTPATIENT)
Dept: WOUND CARE | Facility: CLINIC | Age: 89
End: 2024-11-08
Payer: COMMERCIAL

## 2024-11-08 PROCEDURE — 11042 DBRDMT SUBQ TIS 1ST 20SQCM/<: CPT
